# Patient Record
Sex: MALE | Race: WHITE
[De-identification: names, ages, dates, MRNs, and addresses within clinical notes are randomized per-mention and may not be internally consistent; named-entity substitution may affect disease eponyms.]

---

## 2020-01-06 ENCOUNTER — HOSPITAL ENCOUNTER (INPATIENT)
Dept: HOSPITAL 15 - ER | Age: 65
LOS: 7 days | Discharge: HOME | DRG: 638 | End: 2020-01-13
Attending: INTERNAL MEDICINE | Admitting: NURSE PRACTITIONER
Payer: SELF-PAY

## 2020-01-06 VITALS — HEIGHT: 71 IN | BODY MASS INDEX: 20.15 KG/M2 | WEIGHT: 143.96 LBS

## 2020-01-06 VITALS — DIASTOLIC BLOOD PRESSURE: 66 MMHG | SYSTOLIC BLOOD PRESSURE: 141 MMHG

## 2020-01-06 DIAGNOSIS — E44.1: ICD-10-CM

## 2020-01-06 DIAGNOSIS — Z91.19: ICD-10-CM
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
## 2020-01-06 DIAGNOSIS — E78.5: ICD-10-CM

## 2020-01-06 DIAGNOSIS — E11.65: Primary | ICD-10-CM

## 2020-01-06 DIAGNOSIS — K74.60: ICD-10-CM

## 2020-01-06 DIAGNOSIS — I86.4: ICD-10-CM

## 2020-01-06 DIAGNOSIS — F10.10: ICD-10-CM

## 2020-01-06 DIAGNOSIS — K76.6: ICD-10-CM

## 2020-01-06 DIAGNOSIS — D61.818: ICD-10-CM

## 2020-01-06 DIAGNOSIS — R18.8: ICD-10-CM

## 2020-01-06 DIAGNOSIS — E11.649: ICD-10-CM

## 2020-01-06 DIAGNOSIS — E86.0: ICD-10-CM

## 2020-01-06 DIAGNOSIS — R16.2: ICD-10-CM

## 2020-01-06 DIAGNOSIS — I10: ICD-10-CM

## 2020-01-06 LAB
ALBUMIN SERPL-MCNC: 3.3 G/DL (ref 3.4–5)
ALCOHOL, URINE: < 3 MG/DL (ref 0–5)
ALP SERPL-CCNC: 171 U/L (ref 45–117)
ALT SERPL-CCNC: 103 U/L (ref 16–61)
AMPHETAMINES UR QL SCN: NEGATIVE
ANION GAP SERPL CALCULATED.3IONS-SCNC: 8 MMOL/L (ref 5–15)
BARBITURATES UR QL SCN: NEGATIVE
BENZODIAZ UR QL SCN: NEGATIVE
BILIRUB SERPL-MCNC: 1 MG/DL (ref 0.2–1)
BUN SERPL-MCNC: 15 MG/DL (ref 7–18)
BUN/CREAT SERPL: 17
BZE UR QL SCN: NEGATIVE
CALCIUM SERPL-MCNC: 9.8 MG/DL (ref 8.5–10.1)
CANNABINOIDS UR QL SCN: NEGATIVE
CHLORIDE SERPL-SCNC: 98 MMOL/L (ref 98–107)
CO2 SERPL-SCNC: 29 MMOL/L (ref 21–32)
GLUCOSE SERPL-MCNC: 527 MG/DL (ref 74–106)
HCT VFR BLD AUTO: 41.3 % (ref 41–53)
HGB BLD-MCNC: 14.3 G/DL (ref 13.5–17.5)
MCH RBC QN AUTO: 33.2 PG (ref 28–32)
MCV RBC AUTO: 95.5 FL (ref 80–100)
NRBC BLD QL AUTO: 0.4 %
OPIATES UR QL SCN: NEGATIVE
PCP UR QL SCN: NEGATIVE
POTASSIUM SERPL-SCNC: 4.3 MMOL/L (ref 3.5–5.1)
PROT SERPL-MCNC: 7.9 G/DL (ref 6.4–8.2)
SODIUM SERPL-SCNC: 135 MMOL/L (ref 136–145)

## 2020-01-06 PROCEDURE — 80061 LIPID PANEL: CPT

## 2020-01-06 PROCEDURE — 74177 CT ABD & PELVIS W/CONTRAST: CPT

## 2020-01-06 PROCEDURE — 86708 HEPATITIS A ANTIBODY: CPT

## 2020-01-06 PROCEDURE — 85025 COMPLETE CBC W/AUTO DIFF WBC: CPT

## 2020-01-06 PROCEDURE — 84443 ASSAY THYROID STIM HORMONE: CPT

## 2020-01-06 PROCEDURE — 83036 HEMOGLOBIN GLYCOSYLATED A1C: CPT

## 2020-01-06 PROCEDURE — 80307 DRUG TEST PRSMV CHEM ANLYZR: CPT

## 2020-01-06 PROCEDURE — 96374 THER/PROPH/DIAG INJ IV PUSH: CPT

## 2020-01-06 PROCEDURE — 82962 GLUCOSE BLOOD TEST: CPT

## 2020-01-06 PROCEDURE — 86704 HEP B CORE ANTIBODY TOTAL: CPT

## 2020-01-06 PROCEDURE — 87340 HEPATITIS B SURFACE AG IA: CPT

## 2020-01-06 PROCEDURE — 85730 THROMBOPLASTIN TIME PARTIAL: CPT

## 2020-01-06 PROCEDURE — 86803 HEPATITIS C AB TEST: CPT

## 2020-01-06 PROCEDURE — 71046 X-RAY EXAM CHEST 2 VIEWS: CPT

## 2020-01-06 PROCEDURE — 80048 BASIC METABOLIC PNL TOTAL CA: CPT

## 2020-01-06 PROCEDURE — 96376 TX/PRO/DX INJ SAME DRUG ADON: CPT

## 2020-01-06 PROCEDURE — 86706 HEP B SURFACE ANTIBODY: CPT

## 2020-01-06 PROCEDURE — 83735 ASSAY OF MAGNESIUM: CPT

## 2020-01-06 PROCEDURE — 81001 URINALYSIS AUTO W/SCOPE: CPT

## 2020-01-06 PROCEDURE — 83550 IRON BINDING TEST: CPT

## 2020-01-06 PROCEDURE — 80053 COMPREHEN METABOLIC PANEL: CPT

## 2020-01-06 PROCEDURE — 36415 COLL VENOUS BLD VENIPUNCTURE: CPT

## 2020-01-06 PROCEDURE — 96361 HYDRATE IV INFUSION ADD-ON: CPT

## 2020-01-06 PROCEDURE — 76705 ECHO EXAM OF ABDOMEN: CPT

## 2020-01-06 PROCEDURE — 83540 ASSAY OF IRON: CPT

## 2020-01-06 PROCEDURE — 86703 HIV-1/HIV-2 1 RESULT ANTBDY: CPT

## 2020-01-06 PROCEDURE — 85610 PROTHROMBIN TIME: CPT

## 2020-01-06 PROCEDURE — 82390 ASSAY OF CERULOPLASMIN: CPT

## 2020-01-06 RX ADMIN — HUMAN INSULIN SCH UNITS: 100 INJECTION, SOLUTION SUBCUTANEOUS at 23:47

## 2020-01-06 RX ADMIN — Medication SCH STRIP: at 23:47

## 2020-01-06 RX ADMIN — SODIUM CHLORIDE SCH MLS/HR: 0.9 INJECTION, SOLUTION INTRAVENOUS at 21:34

## 2020-01-06 NOTE — NUR
MS admit from ER

YRIS DOBSON admitted to tele/MS.  Patient oriented to Joselin Owens RN primary RN, unit, 
room, bed, and unit policies regarding patient care and visiting hours. Patient weighed by 
bedscale and encouraged to call if they need something. All questions and concerns 
addressed, patient verbalized understanding. 

Note: Fall and safety precautions in place. Call light within reach.

## 2020-01-06 NOTE — NUR
HOSPITALIST

Paged on-call hospitalist regarding critical blood sugars (see laboratory POC glucose). 
Patient medicated with insulin as ordered. Awaiting call back

## 2020-01-06 NOTE — NUR
HOSPITALIST

Received call from hospitalist on-call, Marvin Luciano NP. New orders received, read back and 
verified. Will input and carry out

## 2020-01-07 VITALS — DIASTOLIC BLOOD PRESSURE: 68 MMHG | SYSTOLIC BLOOD PRESSURE: 127 MMHG

## 2020-01-07 VITALS — SYSTOLIC BLOOD PRESSURE: 125 MMHG | DIASTOLIC BLOOD PRESSURE: 85 MMHG

## 2020-01-07 VITALS — SYSTOLIC BLOOD PRESSURE: 125 MMHG | DIASTOLIC BLOOD PRESSURE: 81 MMHG

## 2020-01-07 LAB
ANION GAP SERPL CALCULATED.3IONS-SCNC: 6 MMOL/L (ref 5–15)
APTT PPP: 28.8 SEC (ref 23.64–32.05)
BUN SERPL-MCNC: 14 MG/DL (ref 7–18)
BUN/CREAT SERPL: 23
CALCIUM SERPL-MCNC: 8.7 MG/DL (ref 8.5–10.1)
CHLORIDE SERPL-SCNC: 108 MMOL/L (ref 98–107)
CHOLEST SERPL-MCNC: 83 MG/DL (ref ?–200)
CO2 SERPL-SCNC: 26 MMOL/L (ref 21–32)
GLUCOSE SERPL-MCNC: 191 MG/DL (ref 74–106)
HDLC SERPL-MCNC: 34 MG/DL (ref 40–59)
INR PPP: 1.13 (ref 0.9–1.15)
MAGNESIUM SERPL-MCNC: 1.6 MG/DL (ref 1.6–2.6)
POTASSIUM SERPL-SCNC: 3.7 MMOL/L (ref 3.5–5.1)
SODIUM SERPL-SCNC: 140 MMOL/L (ref 136–145)
TRIGL SERPL-MCNC: 128 MG/DL (ref ?–150)

## 2020-01-07 RX ADMIN — HUMAN INSULIN SCH UNITS: 100 INJECTION, SOLUTION SUBCUTANEOUS at 17:00

## 2020-01-07 RX ADMIN — Medication SCH STRIP: at 17:00

## 2020-01-07 RX ADMIN — SODIUM CHLORIDE SCH MLS/HR: 0.9 INJECTION, SOLUTION INTRAVENOUS at 08:09

## 2020-01-07 RX ADMIN — Medication SCH STRIP: at 20:19

## 2020-01-07 RX ADMIN — Medication SCH STRIP: at 03:54

## 2020-01-07 RX ADMIN — SODIUM CHLORIDE SCH MLS/HR: 0.9 INJECTION, SOLUTION INTRAVENOUS at 21:43

## 2020-01-07 RX ADMIN — MAGNESIUM OXIDE TAB 400 MG (241.3 MG ELEMENTAL MG) SCH MG: 400 (241.3 MG) TAB at 21:43

## 2020-01-07 RX ADMIN — HUMAN INSULIN SCH UNITS: 100 INJECTION, SOLUTION SUBCUTANEOUS at 20:20

## 2020-01-07 RX ADMIN — HUMAN INSULIN SCH UNITS: 100 INJECTION, SOLUTION SUBCUTANEOUS at 03:54

## 2020-01-07 RX ADMIN — Medication SCH STRIP: at 08:29

## 2020-01-07 RX ADMIN — Medication SCH STRIP: at 23:47

## 2020-01-07 RX ADMIN — HUMAN INSULIN SCH UNITS: 100 INJECTION, SOLUTION SUBCUTANEOUS at 08:30

## 2020-01-07 RX ADMIN — MAGNESIUM SULFATE IN DEXTROSE SCH MLS/HR: 10 INJECTION, SOLUTION INTRAVENOUS at 13:39

## 2020-01-07 RX ADMIN — FAMOTIDINE SCH MG: 20 TABLET, FILM COATED ORAL at 10:08

## 2020-01-07 RX ADMIN — MAGNESIUM SULFATE IN DEXTROSE SCH MLS/HR: 10 INJECTION, SOLUTION INTRAVENOUS at 16:38

## 2020-01-07 RX ADMIN — SODIUM CHLORIDE SCH MLS/HR: 0.9 INJECTION, SOLUTION INTRAVENOUS at 02:15

## 2020-01-07 RX ADMIN — HUMAN INSULIN SCH UNITS: 100 INJECTION, SOLUTION SUBCUTANEOUS at 12:26

## 2020-01-07 RX ADMIN — Medication SCH STRIP: at 11:58

## 2020-01-07 RX ADMIN — HUMAN INSULIN SCH UNITS: 100 INJECTION, SOLUTION SUBCUTANEOUS at 23:47

## 2020-01-07 RX ADMIN — SODIUM CHLORIDE SCH MLS/HR: 0.9 INJECTION, SOLUTION INTRAVENOUS at 13:39

## 2020-01-07 NOTE — NUR
OPENING SHIFT NOTE

RECEIVED REPORT FROM DAYSHIFT RN. PATIENT LYING IN BED WATCHING TELEVISION. NO S/S OF 
DISTRESS OR SOB. NO PAIN NOTED OR REPORTED. PATIENT A/O X4, AMBULATORY. UPDATED PATIENT ON 
POC, VERBALIZED UNDERSTANDING. BED LOCKED IN LOW POSITION, CALL LIGHT WITHIN REACH. WILL 
CONTINUE TO MONITOR PATIENT Q1HR AND PRN.

## 2020-01-07 NOTE — NUR
assessment

Patient is a 64 year old male who is alert and oriented. Patients cognitive abilities are 
intact. Prior to admission patient lived home with family and functioned independently. 
Patient informed me he is able to care for his own ADLs.  Per patient he will return home 
to his prior living arrangements post discharge and family will transport him home. Patient 
has no insurance. Patient has been assessed by Rito Lindsey of [facility]. Per Rito 
medi-mikayla application is secure. I have provided patient with resources for [facility], Dr. Vasques, and [facility] urgent care for follow up visits. I have provided patient with 
a prescription card from community assistance program. I informed patient he has a right to 
speak to a  regarding all care. Patients post discharge needs to be determined. 
I informed patient he has a right to participate in any and all discharge planning.  Patient 
does not have a POA and advanced directive. I have offered patient information on POA and 
advanced directives. I informed the patient the advantages and benefits of having an 
Advanced Directive. Patient verbalized understanding and agreed to discharge plan.

-------------------------------------------------------------------------------

Addendum: 01/07/20 at 1717 by Ginny PEOPLES

-------------------------------------------------------------------------------

Amended: Links added.

## 2020-01-07 NOTE — NUR
HOSPITALIST

Received call back from hospitalist on-call, Marvin Luciano NP. Informed him of critical blood 
sugar, no new orders received. Will continue to monitor

## 2020-01-07 NOTE — NUR
HOSPITALIST

Paged on-call hospitalist regarding critical blood sugar (see laboratory POC glucose). 
Insulin given as ordered. Awaiting call back

## 2020-01-08 VITALS — SYSTOLIC BLOOD PRESSURE: 127 MMHG | DIASTOLIC BLOOD PRESSURE: 81 MMHG

## 2020-01-08 VITALS — DIASTOLIC BLOOD PRESSURE: 83 MMHG | SYSTOLIC BLOOD PRESSURE: 133 MMHG

## 2020-01-08 VITALS — SYSTOLIC BLOOD PRESSURE: 106 MMHG | DIASTOLIC BLOOD PRESSURE: 66 MMHG

## 2020-01-08 VITALS — DIASTOLIC BLOOD PRESSURE: 84 MMHG | SYSTOLIC BLOOD PRESSURE: 123 MMHG

## 2020-01-08 LAB
ALBUMIN SERPL-MCNC: 2.6 G/DL (ref 3.4–5)
ALP SERPL-CCNC: 95 U/L (ref 45–117)
ALT SERPL-CCNC: 80 U/L (ref 16–61)
ANION GAP SERPL CALCULATED.3IONS-SCNC: 4 MMOL/L (ref 5–15)
BILIRUB SERPL-MCNC: 0.6 MG/DL (ref 0.2–1)
BUN SERPL-MCNC: 10 MG/DL (ref 7–18)
BUN/CREAT SERPL: 16.7
CALCIUM SERPL-MCNC: 8.4 MG/DL (ref 8.5–10.1)
CHLORIDE SERPL-SCNC: 112 MMOL/L (ref 98–107)
CO2 SERPL-SCNC: 25 MMOL/L (ref 21–32)
GLUCOSE SERPL-MCNC: 257 MG/DL (ref 74–106)
HCT VFR BLD AUTO: 38.1 % (ref 41–53)
HGB BLD-MCNC: 13.3 G/DL (ref 13.5–17.5)
MAGNESIUM SERPL-MCNC: 2.1 MG/DL (ref 1.6–2.6)
MCH RBC QN AUTO: 33.7 PG (ref 28–32)
MCV RBC AUTO: 96.3 FL (ref 80–100)
NRBC BLD QL AUTO: 0.4 %
POTASSIUM SERPL-SCNC: 3.7 MMOL/L (ref 3.5–5.1)
PROT SERPL-MCNC: 6.4 G/DL (ref 6.4–8.2)
SODIUM SERPL-SCNC: 141 MMOL/L (ref 136–145)

## 2020-01-08 RX ADMIN — SODIUM CHLORIDE SCH MLS/HR: 0.9 INJECTION, SOLUTION INTRAVENOUS at 17:23

## 2020-01-08 RX ADMIN — SODIUM CHLORIDE SCH MLS/HR: 0.9 INJECTION, SOLUTION INTRAVENOUS at 21:32

## 2020-01-08 RX ADMIN — FAMOTIDINE SCH MG: 20 TABLET, FILM COATED ORAL at 09:29

## 2020-01-08 RX ADMIN — Medication SCH STRIP: at 09:00

## 2020-01-08 RX ADMIN — HUMAN INSULIN SCH UNITS: 100 INJECTION, SOLUTION SUBCUTANEOUS at 03:55

## 2020-01-08 RX ADMIN — Medication SCH STRIP: at 12:41

## 2020-01-08 RX ADMIN — SODIUM CHLORIDE SCH MLS/HR: 0.9 INJECTION, SOLUTION INTRAVENOUS at 09:31

## 2020-01-08 RX ADMIN — MAGNESIUM OXIDE TAB 400 MG (241.3 MG ELEMENTAL MG) SCH MG: 400 (241.3 MG) TAB at 09:29

## 2020-01-08 RX ADMIN — HUMAN INSULIN SCH UNITS: 100 INJECTION, SOLUTION SUBCUTANEOUS at 12:41

## 2020-01-08 RX ADMIN — HUMAN INSULIN SCH UNITS: 100 INJECTION, SOLUTION SUBCUTANEOUS at 09:01

## 2020-01-08 RX ADMIN — Medication SCH STRIP: at 03:54

## 2020-01-08 RX ADMIN — Medication SCH STRIP: at 16:04

## 2020-01-08 RX ADMIN — MAGNESIUM OXIDE TAB 400 MG (241.3 MG ELEMENTAL MG) SCH MG: 400 (241.3 MG) TAB at 21:31

## 2020-01-08 RX ADMIN — HUMAN INSULIN SCH UNITS: 100 INJECTION, SOLUTION SUBCUTANEOUS at 21:32

## 2020-01-08 RX ADMIN — LISINOPRIL SCH MG: 5 TABLET ORAL at 09:31

## 2020-01-08 RX ADMIN — HUMAN INSULIN SCH UNITS: 100 INJECTION, SOLUTION SUBCUTANEOUS at 16:07

## 2020-01-08 RX ADMIN — Medication SCH STRIP: at 21:31

## 2020-01-08 RX ADMIN — SODIUM CHLORIDE SCH MLS/HR: 0.9 INJECTION, SOLUTION INTRAVENOUS at 03:54

## 2020-01-08 NOTE — NUR
OPENING NOTE

Assumed care of patient from NOC RNJennifer. Patient awake and alert with no S/S of 
distress/SOB or pain. Instructed on POC and to call for assist PRN, verbalized 
understanding. Bed in lowest, locked position with side rails up x2 and call light within 
reach. Will continue to monitor for changes Q1hr and PRN.

## 2020-01-08 NOTE — NUR
IV 

Infiltrated IV removed, catheter intact and pressure dressing applied. New IV access 
obtained via clean sterile technique by inserting 20 gauge catheter at left forearm after 1 
attempt. IV secured properly. No trauma to site. Patient tolerated well.

## 2020-01-08 NOTE — NUR
Nutrition Assessment Notes



Est energy needs: 2736-7706 kcals (25-30 kcals/kgBW)

Est protein needs: 54-68 gms/day (0.8-1.0 gm/kgBW)



Will continue to monitor and reassess prn














-------------------------------------------------------------------------------

Addendum: 01/08/20 at 1158 by Kimberly Lind RD

-------------------------------------------------------------------------------

Amended: Links added.

## 2020-01-08 NOTE — NUR
consult regarding Advance Directives. Provided pt with information on 
Advance Directives and Durable Power of  Form. Pt verbalized understanding and 
accepted information. Will contact  for anty further concerns or issues. 

Pt has medi-mikayla and has not filled out the paperwork for medicare. Pt was elgible in 
November. 

Pt was referred to Ginny Wills to find a PCP.

## 2020-01-09 VITALS — DIASTOLIC BLOOD PRESSURE: 76 MMHG | SYSTOLIC BLOOD PRESSURE: 129 MMHG

## 2020-01-09 VITALS — SYSTOLIC BLOOD PRESSURE: 128 MMHG | DIASTOLIC BLOOD PRESSURE: 74 MMHG

## 2020-01-09 VITALS — DIASTOLIC BLOOD PRESSURE: 95 MMHG | SYSTOLIC BLOOD PRESSURE: 146 MMHG

## 2020-01-09 VITALS — SYSTOLIC BLOOD PRESSURE: 158 MMHG | DIASTOLIC BLOOD PRESSURE: 88 MMHG

## 2020-01-09 VITALS — DIASTOLIC BLOOD PRESSURE: 59 MMHG | SYSTOLIC BLOOD PRESSURE: 136 MMHG

## 2020-01-09 LAB
ALBUMIN SERPL-MCNC: 2.5 G/DL (ref 3.4–5)
ALP SERPL-CCNC: 76 U/L (ref 45–117)
ALT SERPL-CCNC: 78 U/L (ref 16–61)
ANION GAP SERPL CALCULATED.3IONS-SCNC: 5 MMOL/L (ref 5–15)
BILIRUB SERPL-MCNC: 0.8 MG/DL (ref 0.2–1)
BUN SERPL-MCNC: 8 MG/DL (ref 7–18)
BUN/CREAT SERPL: 15.1
CALCIUM SERPL-MCNC: 8.6 MG/DL (ref 8.5–10.1)
CHLORIDE SERPL-SCNC: 114 MMOL/L (ref 98–107)
CO2 SERPL-SCNC: 25 MMOL/L (ref 21–32)
GLUCOSE SERPL-MCNC: 148 MG/DL (ref 74–106)
HCT VFR BLD AUTO: 37.3 % (ref 41–53)
HGB BLD-MCNC: 12.9 G/DL (ref 13.5–17.5)
MCH RBC QN AUTO: 33.6 PG (ref 28–32)
MCV RBC AUTO: 97.3 FL (ref 80–100)
NRBC BLD QL AUTO: 0.4 %
POTASSIUM SERPL-SCNC: 3.7 MMOL/L (ref 3.5–5.1)
PROT SERPL-MCNC: 6.1 G/DL (ref 6.4–8.2)
SODIUM SERPL-SCNC: 144 MMOL/L (ref 136–145)

## 2020-01-09 RX ADMIN — HUMAN INSULIN SCH UNITS: 100 INJECTION, SOLUTION SUBCUTANEOUS at 04:09

## 2020-01-09 RX ADMIN — SODIUM CHLORIDE SCH MLS/HR: 0.9 INJECTION, SOLUTION INTRAVENOUS at 03:36

## 2020-01-09 RX ADMIN — LISINOPRIL SCH MG: 5 TABLET ORAL at 10:00

## 2020-01-09 RX ADMIN — SODIUM CHLORIDE SCH MLS/HR: 0.9 INJECTION, SOLUTION INTRAVENOUS at 19:35

## 2020-01-09 RX ADMIN — Medication SCH STRIP: at 21:29

## 2020-01-09 RX ADMIN — HUMAN INSULIN SCH UNITS: 100 INJECTION, SOLUTION SUBCUTANEOUS at 13:30

## 2020-01-09 RX ADMIN — MAGNESIUM OXIDE TAB 400 MG (241.3 MG ELEMENTAL MG) SCH MG: 400 (241.3 MG) TAB at 10:00

## 2020-01-09 RX ADMIN — FAMOTIDINE SCH MG: 20 TABLET, FILM COATED ORAL at 10:00

## 2020-01-09 RX ADMIN — HUMAN INSULIN SCH UNITS: 100 INJECTION, SOLUTION SUBCUTANEOUS at 00:08

## 2020-01-09 RX ADMIN — Medication SCH STRIP: at 13:30

## 2020-01-09 RX ADMIN — MAGNESIUM OXIDE TAB 400 MG (241.3 MG ELEMENTAL MG) SCH MG: 400 (241.3 MG) TAB at 21:30

## 2020-01-09 RX ADMIN — Medication SCH STRIP: at 17:23

## 2020-01-09 RX ADMIN — Medication SCH STRIP: at 09:03

## 2020-01-09 RX ADMIN — ACETAMINOPHEN PRN MG: 500 TABLET ORAL at 10:22

## 2020-01-09 RX ADMIN — SODIUM CHLORIDE SCH MLS/HR: 0.9 INJECTION, SOLUTION INTRAVENOUS at 12:55

## 2020-01-09 RX ADMIN — HUMAN INSULIN SCH UNITS: 100 INJECTION, SOLUTION SUBCUTANEOUS at 17:24

## 2020-01-09 RX ADMIN — Medication SCH STRIP: at 00:08

## 2020-01-09 RX ADMIN — Medication SCH STRIP: at 04:06

## 2020-01-09 NOTE — NUR
Opening Shift Note

Assumed care of patient. Patient is awake and alert. No S/S of distress/SOB or pain. 
Instructed on POC and to call for assist PRN, will continue to monitor for changes Q1hr and 
PRN. Bed locked in lowest position and bed rails up x2. Call light within reach.

## 2020-01-09 NOTE — NUR
Opening Shift Note

RECEIVED REPORT FROM NOC RN. Assumed care of patient, awake and alert. No S/S of 
distress/SOB or pain. BED IN LOWEST, LOCKED POSITION WITH SIDERAILS UP x2 AND CALL LIGHT 
WITHIN REACH. Instructed on POC and to call for assist PRN, will continue to monitor for 
changes Q1hr and PRN.

## 2020-01-10 VITALS — DIASTOLIC BLOOD PRESSURE: 57 MMHG | SYSTOLIC BLOOD PRESSURE: 101 MMHG

## 2020-01-10 VITALS — DIASTOLIC BLOOD PRESSURE: 91 MMHG | SYSTOLIC BLOOD PRESSURE: 113 MMHG

## 2020-01-10 VITALS — DIASTOLIC BLOOD PRESSURE: 84 MMHG | SYSTOLIC BLOOD PRESSURE: 141 MMHG

## 2020-01-10 VITALS — SYSTOLIC BLOOD PRESSURE: 137 MMHG | DIASTOLIC BLOOD PRESSURE: 99 MMHG

## 2020-01-10 VITALS — SYSTOLIC BLOOD PRESSURE: 130 MMHG | DIASTOLIC BLOOD PRESSURE: 82 MMHG

## 2020-01-10 VITALS — DIASTOLIC BLOOD PRESSURE: 95 MMHG | SYSTOLIC BLOOD PRESSURE: 151 MMHG

## 2020-01-10 LAB
ALBUMIN SERPL-MCNC: 2.6 G/DL (ref 3.4–5)
ALP SERPL-CCNC: 79 U/L (ref 45–117)
ALT SERPL-CCNC: 84 U/L (ref 16–61)
ANION GAP SERPL CALCULATED.3IONS-SCNC: 6 MMOL/L (ref 5–15)
BILIRUB SERPL-MCNC: 0.8 MG/DL (ref 0.2–1)
BUN SERPL-MCNC: 6 MG/DL (ref 7–18)
BUN/CREAT SERPL: 11.5
CALCIUM SERPL-MCNC: 8.5 MG/DL (ref 8.5–10.1)
CHLORIDE SERPL-SCNC: 113 MMOL/L (ref 98–107)
CO2 SERPL-SCNC: 25 MMOL/L (ref 21–32)
GLUCOSE SERPL-MCNC: 198 MG/DL (ref 74–106)
IRON SERPL-MCNC: 69 UG/DL (ref 65–175)
POTASSIUM SERPL-SCNC: 3.4 MMOL/L (ref 3.5–5.1)
PROT SERPL-MCNC: 6.4 G/DL (ref 6.4–8.2)
SODIUM SERPL-SCNC: 144 MMOL/L (ref 136–145)
TIBC SERPL-MCNC: 281 UG/DL (ref 250–450)

## 2020-01-10 RX ADMIN — Medication SCH STRIP: at 16:50

## 2020-01-10 RX ADMIN — HUMAN INSULIN SCH UNITS: 100 INJECTION, SOLUTION SUBCUTANEOUS at 07:41

## 2020-01-10 RX ADMIN — LISINOPRIL SCH MG: 5 TABLET ORAL at 10:08

## 2020-01-10 RX ADMIN — Medication SCH STRIP: at 11:55

## 2020-01-10 RX ADMIN — HUMAN INSULIN SCH UNITS: 100 INJECTION, SOLUTION SUBCUTANEOUS at 22:12

## 2020-01-10 RX ADMIN — HUMAN INSULIN SCH UNITS: 100 INJECTION, SOLUTION SUBCUTANEOUS at 04:00

## 2020-01-10 RX ADMIN — SODIUM CHLORIDE SCH MLS/HR: 0.9 INJECTION, SOLUTION INTRAVENOUS at 02:27

## 2020-01-10 RX ADMIN — HUMAN INSULIN SCH UNITS: 100 INJECTION, SOLUTION SUBCUTANEOUS at 16:51

## 2020-01-10 RX ADMIN — HUMAN INSULIN SCH UNITS: 100 INJECTION, SOLUTION SUBCUTANEOUS at 01:33

## 2020-01-10 RX ADMIN — Medication SCH STRIP: at 05:42

## 2020-01-10 RX ADMIN — ACETAMINOPHEN PRN MG: 500 TABLET ORAL at 06:32

## 2020-01-10 RX ADMIN — HUMAN INSULIN SCH UNITS: 100 INJECTION, SOLUTION SUBCUTANEOUS at 11:55

## 2020-01-10 RX ADMIN — MAGNESIUM OXIDE TAB 400 MG (241.3 MG ELEMENTAL MG) SCH MG: 400 (241.3 MG) TAB at 10:07

## 2020-01-10 RX ADMIN — Medication SCH STRIP: at 01:33

## 2020-01-10 RX ADMIN — Medication SCH STRIP: at 07:40

## 2020-01-10 RX ADMIN — HUMAN INSULIN SCH UNITS: 100 INJECTION, SOLUTION SUBCUTANEOUS at 01:32

## 2020-01-10 RX ADMIN — FAMOTIDINE SCH MG: 20 TABLET, FILM COATED ORAL at 10:07

## 2020-01-10 RX ADMIN — Medication SCH STRIP: at 20:45

## 2020-01-11 VITALS — SYSTOLIC BLOOD PRESSURE: 125 MMHG | DIASTOLIC BLOOD PRESSURE: 84 MMHG

## 2020-01-11 VITALS — SYSTOLIC BLOOD PRESSURE: 116 MMHG | DIASTOLIC BLOOD PRESSURE: 75 MMHG

## 2020-01-11 VITALS — DIASTOLIC BLOOD PRESSURE: 89 MMHG | SYSTOLIC BLOOD PRESSURE: 121 MMHG

## 2020-01-11 VITALS — SYSTOLIC BLOOD PRESSURE: 130 MMHG | DIASTOLIC BLOOD PRESSURE: 85 MMHG

## 2020-01-11 VITALS — DIASTOLIC BLOOD PRESSURE: 75 MMHG | SYSTOLIC BLOOD PRESSURE: 116 MMHG

## 2020-01-11 VITALS — SYSTOLIC BLOOD PRESSURE: 140 MMHG | DIASTOLIC BLOOD PRESSURE: 86 MMHG

## 2020-01-11 VITALS — DIASTOLIC BLOOD PRESSURE: 84 MMHG | SYSTOLIC BLOOD PRESSURE: 141 MMHG

## 2020-01-11 RX ADMIN — HUMAN INSULIN SCH UNITS: 100 INJECTION, SOLUTION SUBCUTANEOUS at 05:39

## 2020-01-11 RX ADMIN — HUMAN INSULIN SCH UNITS: 100 INJECTION, SOLUTION SUBCUTANEOUS at 17:32

## 2020-01-11 RX ADMIN — HUMAN INSULIN SCH UNITS: 100 INJECTION, SOLUTION SUBCUTANEOUS at 01:35

## 2020-01-11 RX ADMIN — LISINOPRIL SCH MG: 5 TABLET ORAL at 10:24

## 2020-01-11 RX ADMIN — Medication SCH STRIP: at 17:33

## 2020-01-11 RX ADMIN — Medication SCH STRIP: at 20:00

## 2020-01-11 RX ADMIN — FUROSEMIDE SCH MG: 40 TABLET ORAL at 10:24

## 2020-01-11 RX ADMIN — SPIRONOLACTONE SCH MG: 25 TABLET, FILM COATED ORAL at 10:23

## 2020-01-11 RX ADMIN — Medication SCH STRIP: at 11:36

## 2020-01-11 RX ADMIN — FAMOTIDINE SCH MG: 20 TABLET, FILM COATED ORAL at 10:23

## 2020-01-11 RX ADMIN — HUMAN INSULIN SCH UNITS: 100 INJECTION, SOLUTION SUBCUTANEOUS at 20:00

## 2020-01-11 RX ADMIN — Medication SCH STRIP: at 01:35

## 2020-01-11 RX ADMIN — HUMAN INSULIN SCH UNITS: 100 INJECTION, SOLUTION SUBCUTANEOUS at 11:36

## 2020-01-11 RX ADMIN — Medication SCH STRIP: at 05:38

## 2020-01-11 RX ADMIN — HUMAN INSULIN SCH UNITS: 100 INJECTION, SOLUTION SUBCUTANEOUS at 08:15

## 2020-01-11 RX ADMIN — Medication SCH STRIP: at 08:16

## 2020-01-11 NOTE — NUR
Pt alert and oriented, showing no change from initial assessment.  No c/o pain or discomfort 
during this shift.  Diabetic teaching reinforced. Pt verbalizes understanding, and asks 
appropriate questions regarding diet, monitoring of blood sugars, and administration of 
Insulin.

## 2020-01-12 VITALS — SYSTOLIC BLOOD PRESSURE: 125 MMHG | DIASTOLIC BLOOD PRESSURE: 77 MMHG

## 2020-01-12 VITALS — SYSTOLIC BLOOD PRESSURE: 155 MMHG | DIASTOLIC BLOOD PRESSURE: 94 MMHG

## 2020-01-12 VITALS — DIASTOLIC BLOOD PRESSURE: 87 MMHG | SYSTOLIC BLOOD PRESSURE: 124 MMHG

## 2020-01-12 VITALS — DIASTOLIC BLOOD PRESSURE: 77 MMHG | SYSTOLIC BLOOD PRESSURE: 109 MMHG

## 2020-01-12 VITALS — SYSTOLIC BLOOD PRESSURE: 117 MMHG | DIASTOLIC BLOOD PRESSURE: 77 MMHG

## 2020-01-12 RX ADMIN — Medication SCH STRIP: at 04:00

## 2020-01-12 RX ADMIN — HUMAN INSULIN SCH UNITS: 100 INJECTION, SOLUTION SUBCUTANEOUS at 04:00

## 2020-01-12 RX ADMIN — FUROSEMIDE SCH MG: 40 TABLET ORAL at 10:27

## 2020-01-12 RX ADMIN — Medication SCH STRIP: at 08:00

## 2020-01-12 RX ADMIN — HUMAN INSULIN SCH UNITS: 100 INJECTION, SOLUTION SUBCUTANEOUS at 16:46

## 2020-01-12 RX ADMIN — LISINOPRIL SCH MG: 5 TABLET ORAL at 10:27

## 2020-01-12 RX ADMIN — HUMAN INSULIN SCH UNITS: 100 INJECTION, SOLUTION SUBCUTANEOUS at 12:46

## 2020-01-12 RX ADMIN — HUMAN INSULIN SCH UNITS: 100 INJECTION, SOLUTION SUBCUTANEOUS at 20:50

## 2020-01-12 RX ADMIN — FAMOTIDINE SCH MG: 20 TABLET, FILM COATED ORAL at 10:27

## 2020-01-12 RX ADMIN — HUMAN INSULIN SCH UNITS: 100 INJECTION, SOLUTION SUBCUTANEOUS at 09:01

## 2020-01-12 RX ADMIN — Medication SCH STRIP: at 20:50

## 2020-01-12 RX ADMIN — HUMAN INSULIN SCH UNITS: 100 INJECTION, SOLUTION SUBCUTANEOUS at 00:00

## 2020-01-12 RX ADMIN — SPIRONOLACTONE SCH MG: 25 TABLET, FILM COATED ORAL at 10:27

## 2020-01-12 RX ADMIN — Medication SCH STRIP: at 16:00

## 2020-01-12 RX ADMIN — Medication SCH STRIP: at 12:00

## 2020-01-12 RX ADMIN — Medication SCH STRIP: at 00:00

## 2020-01-12 NOTE — NUR
OPENING NOTE- NOC SHIFT

PATIENT IS ALERT AND ORIENTED X4, ANSWERS IN COMPLETE SENTENCES AND MAKES APPROPRIATE EYE 
CONTACT. PATIENT IS IN BED, BED IS LOCKED AT LOWEST POSITION, BED RAILS UP X2 AND HEAD OF 
BED IS UP >30 DEGREES FOR SAFETY PRECAUTIONS. BEDSIDE TABLE WITHIN REACH, CALL LIGHT WITHIN 
REACH. DISCUSSED POC WITH PATIENT AND INSTRUCTED TO CALL PRN; PATIENT VERBALIZED 
UNDERSTANDING. WILL CONTINUE TO MONITOR Q1H AND PRN. MODERATE

## 2020-01-13 VITALS — DIASTOLIC BLOOD PRESSURE: 76 MMHG | SYSTOLIC BLOOD PRESSURE: 110 MMHG

## 2020-01-13 VITALS — DIASTOLIC BLOOD PRESSURE: 67 MMHG | SYSTOLIC BLOOD PRESSURE: 99 MMHG

## 2020-01-13 VITALS — SYSTOLIC BLOOD PRESSURE: 104 MMHG | DIASTOLIC BLOOD PRESSURE: 67 MMHG

## 2020-01-13 RX ADMIN — HUMAN INSULIN SCH UNITS: 100 INJECTION, SOLUTION SUBCUTANEOUS at 08:27

## 2020-01-13 RX ADMIN — Medication SCH STRIP: at 01:04

## 2020-01-13 RX ADMIN — Medication SCH STRIP: at 12:00

## 2020-01-13 RX ADMIN — HUMAN INSULIN SCH UNITS: 100 INJECTION, SOLUTION SUBCUTANEOUS at 12:00

## 2020-01-13 RX ADMIN — HUMAN INSULIN SCH UNITS: 100 INJECTION, SOLUTION SUBCUTANEOUS at 01:04

## 2020-01-13 RX ADMIN — Medication SCH STRIP: at 04:37

## 2020-01-13 RX ADMIN — FUROSEMIDE SCH MG: 40 TABLET ORAL at 12:21

## 2020-01-13 RX ADMIN — SPIRONOLACTONE SCH MG: 25 TABLET, FILM COATED ORAL at 12:21

## 2020-01-13 RX ADMIN — HUMAN INSULIN SCH UNITS: 100 INJECTION, SOLUTION SUBCUTANEOUS at 04:00

## 2020-01-13 RX ADMIN — HUMAN INSULIN SCH UNITS: 100 INJECTION, SOLUTION SUBCUTANEOUS at 17:35

## 2020-01-13 RX ADMIN — LISINOPRIL SCH MG: 5 TABLET ORAL at 12:22

## 2020-01-13 RX ADMIN — Medication SCH STRIP: at 16:00

## 2020-01-13 RX ADMIN — FAMOTIDINE SCH MG: 20 TABLET, FILM COATED ORAL at 12:22

## 2020-01-13 RX ADMIN — Medication SCH STRIP: at 08:27

## 2020-01-13 NOTE — NUR
DISCHARGE NOTE



PATIENT ALERT AND ORIENTED X4 ALL DISCHARGE INSTRUCTIONS GIVEN ALL QUESTIONS AND CONCERNS 
ADDRESSED/ANSWERED. PATIENT VERBALIZED UNDERSTANDING, IV REMOVED CATHETER INTACT PRESSURE 
DRESSING APPLIED PATIENT TOLERATED WELL. VACCINES GIVEN. PATIENT HAS PRESCRIPTIONS AT 
BEDSIDE. PATIENT DENIES ALL SOB AND DISTRESS PATIENT ASSISTED TO PERSONAL VEHICLE USING A 
WHEELCHAIR

## 2023-06-08 NOTE — NUR
Opening Shift Note

Assumed care of patient. Patient is awake and alert. No S/S of distress/SOB or pain. 
Instructed on POC and to call for assist PRN, will continue to monitor for changes Q1hr and 
PRN. Bed locked in lowest position and bed rails up x2. Call light within reach. Hydroxyzine Pregnancy And Lactation Text: This medication is not safe during pregnancy and should not be taken. It is also excreted in breast milk and breast feeding isn't recommended.